# Patient Record
Sex: MALE | Race: OTHER | HISPANIC OR LATINO | URBAN - METROPOLITAN AREA
[De-identification: names, ages, dates, MRNs, and addresses within clinical notes are randomized per-mention and may not be internally consistent; named-entity substitution may affect disease eponyms.]

---

## 2019-02-18 ENCOUNTER — EMERGENCY (EMERGENCY)
Facility: HOSPITAL | Age: 24
LOS: 1 days | Discharge: HOME | End: 2019-02-18
Attending: EMERGENCY MEDICINE
Payer: COMMERCIAL

## 2019-02-18 VITALS
HEART RATE: 83 BPM | SYSTOLIC BLOOD PRESSURE: 141 MMHG | TEMPERATURE: 98 F | OXYGEN SATURATION: 100 % | DIASTOLIC BLOOD PRESSURE: 67 MMHG | RESPIRATION RATE: 18 BRPM | HEIGHT: 66 IN | WEIGHT: 162.04 LBS

## 2019-02-18 PROCEDURE — 99283 EMERGENCY DEPT VISIT LOW MDM: CPT

## 2019-02-18 RX ORDER — IBUPROFEN 200 MG
1 TABLET ORAL
Qty: 28 | Refills: 0 | OUTPATIENT
Start: 2019-02-18 | End: 2019-02-24

## 2019-02-18 RX ORDER — AMOXICILLIN 250 MG/5ML
1 SUSPENSION, RECONSTITUTED, ORAL (ML) ORAL
Qty: 20 | Refills: 0 | OUTPATIENT
Start: 2019-02-18 | End: 2019-02-27

## 2019-02-18 NOTE — ED STATDOCS - ENMT, MLM
Perforated right TM Perforated right TM.  No mastoid tenderness.  No pain to traction of auricle, no tenderness to palpation of tragus.  No otorrhea.

## 2019-02-18 NOTE — ED STATDOCS - OBJECTIVE STATEMENT
22 y/o M pt with no significant medical hx presents to ED c/o x2 weeks of right ear pain with occasional discharge, pain radiates down the lateral aspect of neck. Denies fever, chills, N/V/D, abdominal pain, CP, palpitations, SOB, difficulty breathing, cough, or recent trauma. 24 y/o M pt with no significant medical hx presents to ED c/o x2 weeks of right ear pain with occasional discharge, pain radiates down the lateral aspect of neck. Denies trauma to ear.  Denies fever, chills, cough.

## 2019-02-18 NOTE — ED STATDOCS - NS_ ATTENDINGSCRIBEDETAILS _ED_A_ED_FT
I, Dedrick aYn, performed the initial face to face bedside interview with this patient regarding history of present illness, review of symptoms and relevant past medical, social and family history.  I completed an independent physical examination.  I was the provider who initially evaluated this patient.  The history, relevant review of systems, past medical and surgical history, medical decision making, and physical examination was documented by the scribe in my presence and I attest to the accuracy of the documentation. Follow-up on ordered tests (ie labs, radiologic studies) and re-evaluation of the patient's status has been communicated to the ACP.  Disposition of the patient will be based on test outcome and response to ED interventions.